# Patient Record
Sex: FEMALE | Employment: STUDENT | URBAN - METROPOLITAN AREA
[De-identification: names, ages, dates, MRNs, and addresses within clinical notes are randomized per-mention and may not be internally consistent; named-entity substitution may affect disease eponyms.]

---

## 2024-08-12 ENCOUNTER — OFFICE VISIT (OUTPATIENT)
Age: 19
End: 2024-08-12

## 2024-08-12 VITALS
HEART RATE: 71 BPM | OXYGEN SATURATION: 100 % | TEMPERATURE: 98.1 F | HEIGHT: 62 IN | WEIGHT: 147.6 LBS | SYSTOLIC BLOOD PRESSURE: 107 MMHG | RESPIRATION RATE: 18 BRPM | BODY MASS INDEX: 27.16 KG/M2 | DIASTOLIC BLOOD PRESSURE: 68 MMHG

## 2024-08-12 DIAGNOSIS — R59.0 ENLARGED LYMPH NODE IN NECK: Primary | ICD-10-CM

## 2024-08-12 NOTE — PATIENT INSTRUCTIONS
Watch and wait to see if lymph node resolves.  If this does not resolve and gets larger, please follow up with your PCP.  It most likely is an illness oncoming that is viral etiology.   Call or return to clinic if no improvement or any worsening.

## 2024-08-12 NOTE — PROGRESS NOTES
Amee Flores (:  2005) is a 18 y.o. female,New patient, here for evaluation of the following chief complaint(s):  Mass (Lump behind back of neck. Throbbing pain, sore.)       ASSESSMENT/PLAN:  1. Enlarged lymph node in neck      Watch and wait to see if lymph node resolves.  If this does not resolve and gets larger, please follow up with your PCP.  It most likely is an illness oncoming that is viral etiology.   Call or return to clinic if no improvement or any worsening.  Patient comfortable with plan         SUBJECTIVE/OBJECTIVE:    History provided by:  Patient   used: No    Mass        18 y.o. female presents with symptoms of enlarged lymph node in her posterior auricle area. This is most likely a lymph node that is caused by a viral illness with no symptom onset at this time.     Educated patient to watch and wait and follow up with PCP in 7 days.         Vitals:    24 1916   BP: 107/68   Site: Left Upper Arm   Position: Sitting   Cuff Size: Medium Adult   Pulse: 71   Resp: 18   Temp: 98.1 °F (36.7 °C)   TempSrc: Oral   SpO2: 100%   Weight: 67 kg (147 lb 9.6 oz)   Height: 1.575 m (5' 2\")         Physical Exam  Constitutional:       General: She is not in acute distress.     Appearance: Normal appearance. She is normal weight. She is not ill-appearing, toxic-appearing or diaphoretic.   HENT:      Head: Normocephalic and atraumatic.      Right Ear: Tympanic membrane, ear canal and external ear normal. There is no impacted cerumen.      Left Ear: Tympanic membrane, ear canal and external ear normal. There is no impacted cerumen.      Nose: Nose normal.      Mouth/Throat:      Mouth: Mucous membranes are moist.      Pharynx: Posterior oropharyngeal erythema present.   Eyes:      Extraocular Movements: Extraocular movements intact.      Conjunctiva/sclera: Conjunctivae normal.   Cardiovascular:      Rate and Rhythm: Normal rate.   Pulmonary:      Effort: Pulmonary effort is